# Patient Record
Sex: MALE | Race: WHITE | Employment: FULL TIME | URBAN - METROPOLITAN AREA
[De-identification: names, ages, dates, MRNs, and addresses within clinical notes are randomized per-mention and may not be internally consistent; named-entity substitution may affect disease eponyms.]

---

## 2019-06-19 ENCOUNTER — APPOINTMENT (OUTPATIENT)
Dept: GENERAL RADIOLOGY | Age: 29
End: 2019-06-19
Payer: COMMERCIAL

## 2019-06-19 ENCOUNTER — APPOINTMENT (OUTPATIENT)
Dept: CT IMAGING | Age: 29
End: 2019-06-19
Payer: COMMERCIAL

## 2019-06-19 ENCOUNTER — HOSPITAL ENCOUNTER (EMERGENCY)
Age: 29
Discharge: ANOTHER HOSPITAL | End: 2019-06-19
Attending: EMERGENCY MEDICINE
Payer: COMMERCIAL

## 2019-06-19 VITALS
OXYGEN SATURATION: 99 % | BODY MASS INDEX: 28 KG/M2 | RESPIRATION RATE: 16 BRPM | DIASTOLIC BLOOD PRESSURE: 62 MMHG | HEIGHT: 71 IN | SYSTOLIC BLOOD PRESSURE: 118 MMHG | HEART RATE: 88 BPM | WEIGHT: 200 LBS
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
## 2019-06-19 DIAGNOSIS — S36.429A: ICD-10-CM

## 2019-06-19 DIAGNOSIS — S63.502A SPRAIN OF LEFT WRIST, INITIAL ENCOUNTER: ICD-10-CM

## 2019-06-19 DIAGNOSIS — S01.81XA FACIAL LACERATION, INITIAL ENCOUNTER: ICD-10-CM

## 2019-06-19 LAB
A/G RATIO: 2.1 (ref 1.1–2.2)
ALBUMIN SERPL-MCNC: 5.3 G/DL (ref 3.4–5)
ALP BLD-CCNC: 60 U/L (ref 40–129)
ALT SERPL-CCNC: 46 U/L (ref 10–40)
ANION GAP SERPL CALCULATED.3IONS-SCNC: 15 MMOL/L (ref 3–16)
AST SERPL-CCNC: 47 U/L (ref 15–37)
BASOPHILS ABSOLUTE: 0 K/UL (ref 0–0.2)
BASOPHILS RELATIVE PERCENT: 0.4 %
BILIRUB SERPL-MCNC: 0.4 MG/DL (ref 0–1)
BUN BLDV-MCNC: 13 MG/DL (ref 7–20)
CALCIUM SERPL-MCNC: 9.6 MG/DL (ref 8.3–10.6)
CHLORIDE BLD-SCNC: 105 MMOL/L (ref 99–110)
CO2: 26 MMOL/L (ref 21–32)
CREAT SERPL-MCNC: 1.3 MG/DL (ref 0.9–1.3)
EOSINOPHILS ABSOLUTE: 0 K/UL (ref 0–0.6)
EOSINOPHILS RELATIVE PERCENT: 0.2 %
ETHANOL: 250 MG/DL (ref 0–0.08)
GFR AFRICAN AMERICAN: >60
GFR NON-AFRICAN AMERICAN: >60
GLOBULIN: 2.5 G/DL
GLUCOSE BLD-MCNC: 107 MG/DL (ref 70–99)
HCT VFR BLD CALC: 49.1 % (ref 40.5–52.5)
HEMOGLOBIN: 16.9 G/DL (ref 13.5–17.5)
LYMPHOCYTES ABSOLUTE: 1.5 K/UL (ref 1–5.1)
LYMPHOCYTES RELATIVE PERCENT: 14.6 %
MCH RBC QN AUTO: 32.1 PG (ref 26–34)
MCHC RBC AUTO-ENTMCNC: 34.5 G/DL (ref 31–36)
MCV RBC AUTO: 93.1 FL (ref 80–100)
MONOCYTES ABSOLUTE: 0.9 K/UL (ref 0–1.3)
MONOCYTES RELATIVE PERCENT: 8.1 %
NEUTROPHILS ABSOLUTE: 8.1 K/UL (ref 1.7–7.7)
NEUTROPHILS RELATIVE PERCENT: 76.7 %
PDW BLD-RTO: 12.9 % (ref 12.4–15.4)
PLATELET # BLD: 197 K/UL (ref 135–450)
PMV BLD AUTO: 9 FL (ref 5–10.5)
POTASSIUM SERPL-SCNC: 4 MMOL/L (ref 3.5–5.1)
RBC # BLD: 5.27 M/UL (ref 4.2–5.9)
SODIUM BLD-SCNC: 146 MMOL/L (ref 136–145)
TOTAL PROTEIN: 7.8 G/DL (ref 6.4–8.2)
WBC # BLD: 10.5 K/UL (ref 4–11)

## 2019-06-19 PROCEDURE — 71260 CT THORAX DX C+: CPT

## 2019-06-19 PROCEDURE — 6360000002 HC RX W HCPCS: Performed by: EMERGENCY MEDICINE

## 2019-06-19 PROCEDURE — 80053 COMPREHEN METABOLIC PANEL: CPT

## 2019-06-19 PROCEDURE — 70450 CT HEAD/BRAIN W/O DYE: CPT

## 2019-06-19 PROCEDURE — 6370000000 HC RX 637 (ALT 250 FOR IP): Performed by: EMERGENCY MEDICINE

## 2019-06-19 PROCEDURE — 70486 CT MAXILLOFACIAL W/O DYE: CPT

## 2019-06-19 PROCEDURE — 73070 X-RAY EXAM OF ELBOW: CPT

## 2019-06-19 PROCEDURE — G0480 DRUG TEST DEF 1-7 CLASSES: HCPCS

## 2019-06-19 PROCEDURE — 73590 X-RAY EXAM OF LOWER LEG: CPT

## 2019-06-19 PROCEDURE — 96374 THER/PROPH/DIAG INJ IV PUSH: CPT

## 2019-06-19 PROCEDURE — 4500000026 HC ED CRITICAL CARE PROCEDURE

## 2019-06-19 PROCEDURE — 73130 X-RAY EXAM OF HAND: CPT

## 2019-06-19 PROCEDURE — 6360000004 HC RX CONTRAST MEDICATION: Performed by: EMERGENCY MEDICINE

## 2019-06-19 PROCEDURE — 99291 CRITICAL CARE FIRST HOUR: CPT

## 2019-06-19 PROCEDURE — 85025 COMPLETE CBC W/AUTO DIFF WBC: CPT

## 2019-06-19 PROCEDURE — 72125 CT NECK SPINE W/O DYE: CPT

## 2019-06-19 PROCEDURE — 2580000003 HC RX 258: Performed by: EMERGENCY MEDICINE

## 2019-06-19 PROCEDURE — 36415 COLL VENOUS BLD VENIPUNCTURE: CPT

## 2019-06-19 RX ORDER — MORPHINE SULFATE 4 MG/ML
4 INJECTION, SOLUTION INTRAMUSCULAR; INTRAVENOUS ONCE
Status: COMPLETED | OUTPATIENT
Start: 2019-06-19 | End: 2019-06-19

## 2019-06-19 RX ORDER — LIDOCAINE AND PRILOCAINE 25; 25 MG/G; MG/G
CREAM TOPICAL ONCE
Status: COMPLETED | OUTPATIENT
Start: 2019-06-19 | End: 2019-06-19

## 2019-06-19 RX ORDER — 0.9 % SODIUM CHLORIDE 0.9 %
1000 INTRAVENOUS SOLUTION INTRAVENOUS ONCE
Status: COMPLETED | OUTPATIENT
Start: 2019-06-19 | End: 2019-06-19

## 2019-06-19 RX ADMIN — IOPAMIDOL 75 ML: 755 INJECTION, SOLUTION INTRAVENOUS at 01:19

## 2019-06-19 RX ADMIN — MORPHINE SULFATE 4 MG: 4 INJECTION INTRAVENOUS at 01:57

## 2019-06-19 RX ADMIN — LIDOCAINE AND PRILOCAINE: 25; 25 CREAM TOPICAL at 01:58

## 2019-06-19 RX ADMIN — SODIUM CHLORIDE 1000 ML: 9 INJECTION, SOLUTION INTRAVENOUS at 01:57

## 2019-06-19 ASSESSMENT — ENCOUNTER SYMPTOMS
COLOR CHANGE: 0
BLOOD IN STOOL: 0
FACIAL SWELLING: 0
SHORTNESS OF BREATH: 0
ABDOMINAL PAIN: 0
PHOTOPHOBIA: 0
WHEEZING: 0
TROUBLE SWALLOWING: 0
VOICE CHANGE: 0
VOMITING: 0
STRIDOR: 0
NAUSEA: 0
BACK PAIN: 0

## 2019-06-19 ASSESSMENT — PAIN SCALES - GENERAL: PAINLEVEL_OUTOF10: 6

## 2019-06-19 NOTE — ED NOTES
Wound to left arm cleansed well, triple abt and dressing applied. Dressing and abt ointment placed to sutured place on forehead. Patient abd soft, noted increased tenderness to abd. Patient did not c/o abd tenderness prior. Abd has been soft during visit. Patient attempting to contact friend to let friend know he was going to UC without success. Offered to call friend or family and patient declined offer.       Jhonny Bai RN  06/19/19 9505

## 2019-06-19 NOTE — ED NOTES
MD in speaking with patient about transfer to UC, patient aware and agreeable.       Travis San, RN  06/19/19 9747
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
## 2019-06-19 NOTE — ED NOTES
Negrito made aware of need for disks for transport.       [place] DAMION Rainey  06/19/19 6886